# Patient Record
Sex: FEMALE | Race: ASIAN | NOT HISPANIC OR LATINO | Employment: OTHER | URBAN - METROPOLITAN AREA
[De-identification: names, ages, dates, MRNs, and addresses within clinical notes are randomized per-mention and may not be internally consistent; named-entity substitution may affect disease eponyms.]

---

## 2023-08-12 ENCOUNTER — OFFICE VISIT (OUTPATIENT)
Dept: URGENT CARE | Facility: CLINIC | Age: 73
End: 2023-08-12
Payer: MEDICARE

## 2023-08-12 VITALS
BODY MASS INDEX: 31.01 KG/M2 | OXYGEN SATURATION: 95 % | WEIGHT: 134 LBS | HEART RATE: 72 BPM | HEIGHT: 55 IN | DIASTOLIC BLOOD PRESSURE: 80 MMHG | RESPIRATION RATE: 14 BRPM | SYSTOLIC BLOOD PRESSURE: 130 MMHG | TEMPERATURE: 97.6 F

## 2023-08-12 DIAGNOSIS — R21 RASH OF FACE: ICD-10-CM

## 2023-08-12 PROCEDURE — 3075F SYST BP GE 130 - 139MM HG: CPT

## 2023-08-12 PROCEDURE — 3079F DIAST BP 80-89 MM HG: CPT

## 2023-08-12 PROCEDURE — 99213 OFFICE O/P EST LOW 20 MIN: CPT

## 2023-08-12 RX ORDER — TRIAMCINOLONE ACETONIDE 0.25 MG/G
1 CREAM TOPICAL 2 TIMES DAILY
Qty: 15 G | Refills: 0 | Status: SHIPPED | OUTPATIENT
Start: 2023-08-12 | End: 2023-08-19

## 2023-08-12 ASSESSMENT — ENCOUNTER SYMPTOMS
COUGH: 0
SORE THROAT: 0
VOMITING: 0
SHORTNESS OF BREATH: 0

## 2023-08-12 NOTE — PROGRESS NOTES
Subjective:   Ok To is a very pleasant 72 y.o. female who presents for:    Chief Complaint   Patient presents with    Rash     X 3 days, rash on face not itchy not painful, concerned it's an allergic reaction to shrimp.     Son is accompanying the patient and is the .    HPI:    The patient endorses that approximately 3 days ago she developed a rash on the face. Rash characteristics: no pain, itching. Three days ago, she enjoyed a meal with shrimp, which she feels may have contributed to her symptoms. In the past she has had systemic reaction to shellfish, including hives, but has always tolerated shrimp without reaction. Pertinent negatives include no congestion, cough, facial edema, rhinorrhea, shortness of breath, sore throat or vomiting. Past treatments include nothing.  She denies exposure to new lotion, soap, cosmetic products, plants, environmental irritant. There is no history of asthma, eczema or varicella.  She is visiting her son from [place] during the time of clinical examination    ROS:    Review of Systems   HENT:  Negative for congestion and sore throat.    Respiratory:  Negative for cough and shortness of breath.    Gastrointestinal:  Negative for vomiting.   Skin:  Positive for rash. Negative for itching.   All other systems reviewed and are negative.      Medications:      Current Outpatient Medications   Medication Sig    NON SPECIFIED Anastone for breast cancer.       Allergies:     Allergies   Allergen Reactions    Shellfish Allergy Rash     Rash,        Problem List:     There is no problem list on file for this patient.      Surgical History:    No past surgical history on file.    Past Social Hx:     Social History     Socioeconomic History    Marital status:    Tobacco Use    Smoking status: Never    Smokeless tobacco: Never   Vaping Use    Vaping Use: Never used   Substance and Sexual Activity    Alcohol use: Never    Drug use: Never        Past Family Hx:      History  reviewed. No pertinent family history.    Problem list, medications, and allergies reviewed by myself today in Epic.     Objective:     Vitals:    08/12/23 1411   BP: 130/80   Pulse: 72   Resp: 14   Temp: 36.4 °C (97.6 °F)   SpO2: 95%       Physical Exam  Vitals reviewed.   Constitutional:       General: She is not in acute distress.     Appearance: Normal appearance. She is not ill-appearing, toxic-appearing or diaphoretic.   HENT:      Head: Normocephalic and atraumatic.        Comments: Scattered erythematous maculopapular rash present on the bilateral cheeks.  No evidence of urticaria, vesicles, scaling, crusting.     Right Ear: Tympanic membrane, ear canal and external ear normal.      Left Ear: Tympanic membrane, ear canal and external ear normal.      Nose: Nose normal.      Mouth/Throat:      Mouth: Mucous membranes are moist.      Pharynx: Oropharynx is clear.   Eyes:      Extraocular Movements: Extraocular movements intact.      Conjunctiva/sclera: Conjunctivae normal.      Pupils: Pupils are equal, round, and reactive to light.   Cardiovascular:      Rate and Rhythm: Normal rate and regular rhythm.      Pulses: Normal pulses.      Heart sounds: Normal heart sounds.   Pulmonary:      Effort: Pulmonary effort is normal.      Breath sounds: Normal breath sounds.   Abdominal:      General: Abdomen is flat.      Palpations: Abdomen is soft.   Musculoskeletal:         General: Normal range of motion.      Cervical back: Normal range of motion.   Skin:     General: Skin is warm and dry.   Neurological:      General: No focal deficit present.      Mental Status: She is alert and oriented to person, place, and time.   Psychiatric:         Mood and Affect: Mood normal.         Behavior: Behavior normal.         Thought Content: Thought content normal.                     Assessment/Plan:     Diagnosis and associated orders:     1. Rash of face  - triamcinolone acetonide (KENALOG) 0.025 % Cream; Apply 1 Application  topically 2 times a day for 7 days.  Dispense: 15 g; Refill: 0    Other orders  - NON SPECIFIED; Anastone for breast cancer.          Comments/MDM:     Prescription for triamcinolone 0.025% cream sent to patient's preferred pharmacy for treatment of the rash  Patient to start daily cetirizine for the next 7 to 14 days  Supportive care for rash discussed with patient, including allergen avoidance, keeping the area clean and dry, may apply mild moisturizer, avoid sun exposure  Return to clinic if symptoms fail to improve          All questions answered. Patient verbalized understanding and is in agreement with this plan of care.     If symptoms are worsening or not improving in 3-5 days, follow-up with PCP or return to UC. Differential diagnosis, natural history, and supportive care discussed. AVS handout given and reviewed with patient. Patient educated on red flags and when to seek treatment back in ED or UC.     I personally reviewed prior external notes and test results pertinent to today's visit.  I have independently reviewed and interpreted all diagnostics ordered during this urgent care visit.     This dictation has been created using voice recognition software. The accuracy of the dictation is limited by the abilities of the software. I expect there may be some errors of grammar and possibly content. I made every attempt to manually correct the errors within my dictation. However, errors related to voice recognition software may still exist and should be interpreted within the appropriate context.    This note was electronically signed by ANABELLA Weller

## 2023-08-14 ENCOUNTER — OFFICE VISIT (OUTPATIENT)
Dept: URGENT CARE | Facility: CLINIC | Age: 73
End: 2023-08-14
Payer: MEDICARE

## 2023-08-14 VITALS
HEART RATE: 75 BPM | DIASTOLIC BLOOD PRESSURE: 84 MMHG | WEIGHT: 134 LBS | OXYGEN SATURATION: 98 % | HEIGHT: 62 IN | TEMPERATURE: 97.6 F | SYSTOLIC BLOOD PRESSURE: 148 MMHG | BODY MASS INDEX: 24.66 KG/M2 | RESPIRATION RATE: 14 BRPM

## 2023-08-14 DIAGNOSIS — S61.217A LACERATION OF LEFT LITTLE FINGER WITHOUT FOREIGN BODY, NAIL DAMAGE STATUS UNSPECIFIED, INITIAL ENCOUNTER: ICD-10-CM

## 2023-08-14 PROCEDURE — 3079F DIAST BP 80-89 MM HG: CPT

## 2023-08-14 PROCEDURE — 90471 IMMUNIZATION ADMIN: CPT

## 2023-08-14 PROCEDURE — 90714 TD VACC NO PRESV 7 YRS+ IM: CPT

## 2023-08-14 PROCEDURE — 99213 OFFICE O/P EST LOW 20 MIN: CPT | Mod: 25

## 2023-08-14 PROCEDURE — 3077F SYST BP >= 140 MM HG: CPT

## 2023-08-14 ASSESSMENT — ENCOUNTER SYMPTOMS
CHILLS: 0
FEVER: 0

## 2023-08-14 NOTE — PROGRESS NOTES
Subjective:   Ok Lazaro is a very pleasant 72 y.o. female who presents for:    Chief Complaint   Patient presents with    Laceration     X1day, with knife, left pinky finger        HPI:    The patient presents to the urgent care with complaints of right pinky fingertip laceration.  Episode occurred 1 day ago when patient was using a clean knife while preparing food.  The laceration is about 1 cm in size and there is no nailbed involvement.  She is not having any pain at this time.  No signs of infection, numbness or tingling in the affected finger.  She has been using mild soap and water to keep the wound clean.  There are no foreign bodies present. The patient resides in [place] and is unaware of her last Tdap vaccination.       ROS:    Review of Systems   Constitutional:  Negative for chills and fever.   Skin:         Left pinky finger skin avulsion    All other systems reviewed and are negative.      Medications:      Current Outpatient Medications   Medication Sig    NON SPECIFIED Anastone for breast cancer.    triamcinolone acetonide (KENALOG) 0.025 % Cream Apply 1 Application topically 2 times a day for 7 days.       Allergies:     Allergies   Allergen Reactions    Shellfish Allergy Rash     Rash,        Problem List:     There is no problem list on file for this patient.      Surgical History:    No past surgical history on file.    Past Social Hx:     Social History     Socioeconomic History    Marital status:    Tobacco Use    Smoking status: Never    Smokeless tobacco: Never   Vaping Use    Vaping Use: Never used   Substance and Sexual Activity    Alcohol use: Never    Drug use: Never        Past Family Hx:      History reviewed. No pertinent family history.    Problem list, medications, and allergies reviewed by myself today in Epic.     Objective:     Vitals:    08/14/23 1115   BP: (!) 148/84   Pulse: 75   Resp: 14   Temp: 36.4 °C (97.6 °F)   SpO2: 98%       Physical Exam  Vitals reviewed.    Constitutional:       General: She is not in acute distress.     Appearance: Normal appearance. She is not ill-appearing, toxic-appearing or diaphoretic.   HENT:      Head: Normocephalic and atraumatic.      Right Ear: Tympanic membrane, ear canal and external ear normal.      Left Ear: Tympanic membrane, ear canal and external ear normal.      Nose: Nose normal.      Mouth/Throat:      Mouth: Mucous membranes are moist.      Pharynx: Oropharynx is clear.   Eyes:      Extraocular Movements: Extraocular movements intact.      Conjunctiva/sclera: Conjunctivae normal.      Pupils: Pupils are equal, round, and reactive to light.   Cardiovascular:      Rate and Rhythm: Normal rate.      Pulses: Normal pulses.   Pulmonary:      Effort: Pulmonary effort is normal.   Musculoskeletal:         General: Normal range of motion.        Hands:       Cervical back: Normal range of motion.      Comments: 1 cm skin avulsion present on the distal aspect of the right fifth digit.  No surrounding erythema, drainage, warmth.  Hemostasis was achieved prior to physical examination.  No foreign bodies appreciated.  Neurovascularly intact.   Skin:     General: Skin is warm and dry.   Neurological:      General: No focal deficit present.      Mental Status: She is alert and oriented to person, place, and time.   Psychiatric:         Mood and Affect: Mood normal.         Behavior: Behavior normal.         Thought Content: Thought content normal.                     Assessment/Plan:     Diagnosis and associated orders:     1. Laceration of left little finger without foreign body, nail damage status unspecified, initial encounter  - TD Preservative Free =>6yo IM        Comments/MDM:     Declined x-ray at this time  Wound irrigated in clinic, Xeroform and nonstick bandages applied.    Supplies given to patient.  Wound management for fingertip skin avulsion discussed with patient and her son in clinic.  TD given in clinic  Return to  in the  next 2 to 3 days for wound evaluation       All questions answered. Patient verbalized understanding and is in agreement with this plan of care.     If symptoms are worsening or not improving in 3-5 days, follow-up with PCP or return to UC. Differential diagnosis, natural history, and supportive care discussed. AVS handout given and reviewed with patient. Patient educated on red flags and when to seek treatment back in ED or UC.     I personally reviewed prior external notes and test results pertinent to today's visit.  I have independently reviewed and interpreted all diagnostics ordered during this urgent care visit.     This dictation has been created using voice recognition software. The accuracy of the dictation is limited by the abilities of the software. I expect there may be some errors of grammar and possibly content. I made every attempt to manually correct the errors within my dictation. However, errors related to voice recognition software may still exist and should be interpreted within the appropriate context.    This note was electronically signed by ANABELLA Weller

## 2023-08-16 ENCOUNTER — OFFICE VISIT (OUTPATIENT)
Dept: URGENT CARE | Facility: CLINIC | Age: 73
End: 2023-08-16
Payer: MEDICARE

## 2023-08-16 VITALS
HEIGHT: 62 IN | TEMPERATURE: 98 F | DIASTOLIC BLOOD PRESSURE: 70 MMHG | BODY MASS INDEX: 25.1 KG/M2 | WEIGHT: 136.4 LBS | RESPIRATION RATE: 16 BRPM | SYSTOLIC BLOOD PRESSURE: 116 MMHG | HEART RATE: 74 BPM | OXYGEN SATURATION: 98 %

## 2023-08-16 DIAGNOSIS — Z51.89 VISIT FOR WOUND CHECK: ICD-10-CM

## 2023-08-16 DIAGNOSIS — S61.209D AVULSION OF SKIN OF FINGER, SUBSEQUENT ENCOUNTER: ICD-10-CM

## 2023-08-16 PROCEDURE — 3078F DIAST BP <80 MM HG: CPT | Performed by: PHYSICIAN ASSISTANT

## 2023-08-16 PROCEDURE — 3074F SYST BP LT 130 MM HG: CPT | Performed by: PHYSICIAN ASSISTANT

## 2023-08-16 PROCEDURE — 99212 OFFICE O/P EST SF 10 MIN: CPT | Performed by: PHYSICIAN ASSISTANT

## 2023-08-16 ASSESSMENT — ENCOUNTER SYMPTOMS: FEVER: 0

## 2023-08-16 NOTE — PROGRESS NOTES
"Subjective     Ok To is a 72 y.o. female who presents with Wound Check (Left pinky, \" finger feels ok.\" )          The patient presents to clinic for a wound check.  The patient was initially seen in clinic on 8/14/2023 for an avulsion injury to the distal aspect of her left fifth digit.  The patient's wound was cleaned and dressed in clinic.  The patient presents to clinic today with her son who helps translate throughout the duration of the encounter.  The patient states the wound to her left fifth digit is healing well.  She reports minimal pain to the area.  She reports no associated swelling, redness, or discharge/drainage.  The patient has been keeping the wound covered.  She reports no associated fever.  The patient reports no decreased range of motion of the left fifth digit.  The patient has not taken any OTC medications for her current symptoms.    Wound Check  Treated in ED: x 2 days ago. Previous treatment included wound cleansing or irrigation.     PMH:  has a past medical history of Cancer (HCC) and Cervical cancer (HCC).  MEDS:   Current Outpatient Medications:     triamcinolone acetonide (KENALOG) 0.025 % Cream, Apply 1 Application topically 2 times a day for 7 days., Disp: 15 g, Rfl: 0    NON SPECIFIED, Anastone for breast cancer., Disp: , Rfl:   ALLERGIES:   Allergies   Allergen Reactions    Shellfish Allergy Rash     Rash,      SURGHX: No past surgical history on file.  SOCHX:  reports that she has never smoked. She has never used smokeless tobacco. She reports that she does not drink alcohol and does not use drugs.  FH: Family history was reviewed, no pertinent findings to report;fs      Review of Systems   Constitutional:  Negative for fever.   Skin:         + healing avulsion injury of left 5th digit              Objective     /70 (BP Location: Right arm, Patient Position: Sitting, BP Cuff Size: Adult)   Pulse 74   Temp 36.7 °C (98 °F) (Temporal)   Resp 16   Ht 1.575 m (5' 2\")   Wt " 61.9 kg (136 lb 6.4 oz)   SpO2 98%   BMI 24.95 kg/m²      Physical Exam  Constitutional:       General: She is not in acute distress.     Appearance: Normal appearance. She is well-developed.   HENT:      Head: Normocephalic and atraumatic.      Right Ear: External ear normal.      Left Ear: External ear normal.      Nose: Nose normal.   Eyes:      Extraocular Movements: Extraocular movements intact.      Conjunctiva/sclera: Conjunctivae normal.   Cardiovascular:      Rate and Rhythm: Normal rate.   Pulmonary:      Effort: Pulmonary effort is normal.   Musculoskeletal:      Cervical back: Normal range of motion and neck supple.      Comments:   Left 5th Digit:   A healing skin avulsion is present to the tip of the left fifth digit with a visible healthy wound base.  No edema.  No surrounding erythema.  No increased warmth.  No active discharge/drainage.  No secondary signs of infection.   Skin:     General: Skin is warm and dry.   Neurological:      Mental Status: She is alert and oriented to person, place, and time.                  Progress:  Redressed the patient's wound today in clinic.  Educated the patient and her son on proper wound care.  Advised the patient and her son to monitor for signs of infection.           Assessment & Plan          1. Visit for wound check    2. Avulsion of skin of finger, subsequent encounter    Differential diagnoses, supportive care, and indications for immediate follow-up discussed with patient.   Instructed to return to clinic or nearest emergency department for any change in condition, further concerns, or worsening of symptoms.    OTC Tylenol or Motrin for fever/discomfort.  Keep wound clean and dry  Apply Polysporin/Neosporin to the wound as needed  Cover wound as needed  Allow wound to be open to the air for at least a portion of the day  Avoid soaking the wound  Monitor for signs of infection  Follow-up with PCP  Return to clinic or go to the ED if symptoms worsen or  fail to improve, or if patient should develop worsening/increasing/persistent pain/tenderness to the injured area, swelling, bruising, redness or warmth to the injured area, discharge/drainage from the wound, decreased range of motion, fever/chills, secondary signs of infection, and/or any concerning symptoms.    Discussed plan with the patient, and she agrees to the above.     I personally reviewed prior external notes and test results pertinent to today's visit.  I have independently reviewed and interpreted all diagnostics ordered during this urgent care visit.     Please note that this dictation was created using voice recognition software. I have made every reasonable attempt to correct obvious errors, but I expect that there may be errors of grammar and possibly content that I did not discover before finalizing the note.     This note was electronically signed by Mae Crook PA-C

## 2024-09-06 ENCOUNTER — APPOINTMENT (OUTPATIENT)
Dept: RADIOLOGY | Facility: MEDICAL CENTER | Age: 74
End: 2024-09-06
Attending: EMERGENCY MEDICINE
Payer: MEDICARE

## 2024-09-06 ENCOUNTER — HOSPITAL ENCOUNTER (EMERGENCY)
Facility: MEDICAL CENTER | Age: 74
End: 2024-09-06
Attending: EMERGENCY MEDICINE
Payer: MEDICARE

## 2024-09-06 VITALS
HEIGHT: 62 IN | TEMPERATURE: 98 F | DIASTOLIC BLOOD PRESSURE: 82 MMHG | RESPIRATION RATE: 14 BRPM | HEART RATE: 79 BPM | BODY MASS INDEX: 24.75 KG/M2 | OXYGEN SATURATION: 96 % | SYSTOLIC BLOOD PRESSURE: 199 MMHG | WEIGHT: 134.48 LBS

## 2024-09-06 DIAGNOSIS — S62.102A LEFT WRIST FRACTURE, CLOSED, INITIAL ENCOUNTER: ICD-10-CM

## 2024-09-06 PROCEDURE — 700102 HCHG RX REV CODE 250 W/ 637 OVERRIDE(OP): Performed by: EMERGENCY MEDICINE

## 2024-09-06 PROCEDURE — 29125 APPL SHORT ARM SPLINT STATIC: CPT

## 2024-09-06 PROCEDURE — 700111 HCHG RX REV CODE 636 W/ 250 OVERRIDE (IP): Performed by: EMERGENCY MEDICINE

## 2024-09-06 PROCEDURE — 99284 EMERGENCY DEPT VISIT MOD MDM: CPT

## 2024-09-06 PROCEDURE — 73100 X-RAY EXAM OF WRIST: CPT | Mod: LT

## 2024-09-06 PROCEDURE — A9270 NON-COVERED ITEM OR SERVICE: HCPCS | Performed by: EMERGENCY MEDICINE

## 2024-09-06 PROCEDURE — 73080 X-RAY EXAM OF ELBOW: CPT | Mod: LT

## 2024-09-06 PROCEDURE — 302874 HCHG BANDAGE ACE 2 OR 3""

## 2024-09-06 RX ORDER — IBUPROFEN 600 MG/1
600 TABLET, FILM COATED ORAL ONCE
Status: COMPLETED | OUTPATIENT
Start: 2024-09-06 | End: 2024-09-06

## 2024-09-06 RX ORDER — HYDROCODONE BITARTRATE AND ACETAMINOPHEN 5; 325 MG/1; MG/1
1 TABLET ORAL ONCE
Status: COMPLETED | OUTPATIENT
Start: 2024-09-06 | End: 2024-09-06

## 2024-09-06 RX ORDER — ONDANSETRON 4 MG/1
4 TABLET, ORALLY DISINTEGRATING ORAL ONCE
Status: COMPLETED | OUTPATIENT
Start: 2024-09-06 | End: 2024-09-06

## 2024-09-06 RX ADMIN — ONDANSETRON 4 MG: 4 TABLET, ORALLY DISINTEGRATING ORAL at 10:18

## 2024-09-06 RX ADMIN — HYDROCODONE BITARTRATE AND ACETAMINOPHEN 1 TABLET: 5; 325 TABLET ORAL at 10:17

## 2024-09-06 RX ADMIN — IBUPROFEN 600 MG: 600 TABLET, FILM COATED ORAL at 10:18

## 2024-09-06 NOTE — ED NOTES
Pt states she had a mechanical ground level fall while climbing up a loose gravel hill. Pt has left elbow pain with left wrist pain. Pt has swelling in the left distal radius. Distal CMS intact.

## 2024-09-06 NOTE — ED PROVIDER NOTES
"ED Provider Note    CHIEF COMPLAINT  Chief Complaint   Patient presents with    Wrist Pain       EXTERNAL RECORDS REVIEWED  Other none    HPI/ROS  LIMITATION TO HISTORY   Select: : None    OUTSIDE HISTORIAN(S):  Significant other patient's  is at the bedside    Ok Lazaro is a 74 y.o. female who presents for evaluation of left wrist and left elbow pain following a ground-level fall.  Patient was walking on an uneven surface today when she fell backwards onto outstretched hand.  She denies any head trauma, shoulder pain, upper arm pain, or neck pain.  She did not have syncope.  Patient denies numbness in the hand.    PAST MEDICAL HISTORY   has a past medical history of Cancer (HCC) and Cervical cancer (HCC).    SURGICAL HISTORY  patient denies any surgical history    FAMILY HISTORY  No family history on file.    SOCIAL HISTORY  Social History     Tobacco Use    Smoking status: Never    Smokeless tobacco: Never   Vaping Use    Vaping status: Never Used   Substance and Sexual Activity    Alcohol use: Never    Drug use: Never    Sexual activity: Not on file       CURRENT MEDICATIONS  Home Medications    **Home medications have not yet been reviewed for this encounter**       Audit from Redirected Encounters    **Home medications have not yet been reviewed for this encounter**         ALLERGIES  Allergies   Allergen Reactions    Shellfish Allergy Rash     Rash,        PHYSICAL EXAM  VITAL SIGNS: BP (!) 199/82   Pulse 79   Temp 36.7 °C (98 °F) (Temporal)   Resp 14   Ht 1.575 m (5' 2\")   Wt 61 kg (134 lb 7.7 oz)   SpO2 96%   BMI 24.60 kg/m²    General:  WDWN female, nontoxic appearing in NAD; A+Ox3; V/S as above; elevated BP  Skin: warm and dry; good color; no rash  HEENT: NCAT  Neck: FROM  Extremities: Left wrist is swollen/deformed; radial pulse 2+; able to wiggle fingers with distal sensation intact; no point tenderness over the elbow, forearm, upper arm, shoulder, or clavicle  Neurologic: CNs III-XII grossly " intact; speech clear; distal sensation intact  Psychiatric: Appropriate affect, normal mood      EKG/LABS  none    RADIOLOGY/PROCEDURES   I have independently interpreted the diagnostic imaging associated with this visit and am waiting the final reading from the radiologist.   My preliminary interpretation is as follows:   Elbow shows no fracture  Wrist x-ray shows Colles' fracture with impacted radius and ulnar styloid fractures    Radiologist interpretation:  DX-WRIST-LIMITED 2- LEFT   Final Result      1.  Comminuted, impacted, displaced and dorsally angulated distal radial fracture with significant articular surface involvement.      2.  Ulnar styloid fracture.         DX-ELBOW-COMPLETE 3+ LEFT   Final Result      No evidence of acute fracture or dislocation.          COURSE & MEDICAL DECISION MAKING    ASSESSMENT, COURSE AND PLAN  Care Narrative: This is a 74-year-old right handed female who presents following a ground-level fall onto outstretched left hand.  Patient is neurovascularly intact.    X-rays of the left elbow and left wrist ordered.    N.p.o. since 530 last night when she ate dinner.  She has had sips of water today.    Motrin, Norco, and Zofran ordered.    X-ray shows comminuted, impacted, displaced, and dorsally angulated distal radius fracture with significant articular surface involvement.  There is also ulnar styloid fracture noted.    10:25 AM  Paging orthopedics    10:32 AM  Dr. Solis has reviewed the x-ray and requests the patient be placed in a volar splint.  He states the patient may go to the office today to see one of the PAs there or see Dr. Solis on Monday.    Patient has been advised of the plan to place a splint and have outpatient follow-up with orthopedics.  They are amenable to this.  Patient will take Tylenol and ibuprofen at home.  She declines narcotic prescription.        DISPOSITION AND DISCUSSIONS  I have discussed management of the patient with the following  physicians and [facility]:    Irma    FINAL DIAGNOSIS  1. Left wrist fracture, closed, initial encounter         Electronically signed by: Bessie Easley M.D., 9/6/2024 9:21 AM

## 2024-09-06 NOTE — DISCHARGE INSTRUCTIONS
Wear splint until you see the orthopedic specialist at the [facility].    Return to the ER for worsening pain, numbness, or other concerns    Take Tylenol 650 mg every 4 hours as needed for pain.    Take ibuprofen (also known as Advil or Motrin) 600 mg every 6 hours with food and water as needed for pain.